# Patient Record
Sex: FEMALE | URBAN - METROPOLITAN AREA
[De-identification: names, ages, dates, MRNs, and addresses within clinical notes are randomized per-mention and may not be internally consistent; named-entity substitution may affect disease eponyms.]

---
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
## 2023-01-02 NOTE — ED PEDIATRIC NURSE NOTE - NS ED NURSE REPORT GIVEN TO FT
Per Raudel Post MD's order verbal and written stool collection instructions were given to patient.   Jocelynn DU